# Patient Record
Sex: FEMALE | Race: WHITE | NOT HISPANIC OR LATINO | Employment: OTHER | URBAN - METROPOLITAN AREA
[De-identification: names, ages, dates, MRNs, and addresses within clinical notes are randomized per-mention and may not be internally consistent; named-entity substitution may affect disease eponyms.]

---

## 2023-02-27 DIAGNOSIS — M25.562 LEFT KNEE PAIN, UNSPECIFIED CHRONICITY: Primary | ICD-10-CM

## 2023-03-01 ENCOUNTER — HOSPITAL ENCOUNTER (OUTPATIENT)
Dept: GENERAL RADIOLOGY | Facility: HOSPITAL | Age: 73
Discharge: HOME | End: 2023-03-01
Admitting: PHYSICIAN ASSISTANT
Payer: MEDICARE

## 2023-03-01 ENCOUNTER — OFFICE VISIT (OUTPATIENT)
Dept: ORTHOPEDIC SURGERY | Facility: CLINIC | Age: 73
End: 2023-03-01
Payer: MEDICARE

## 2023-03-01 VITALS — BODY MASS INDEX: 26.52 KG/M2 | WEIGHT: 165 LBS | TEMPERATURE: 98.6 F | HEIGHT: 66 IN

## 2023-03-01 DIAGNOSIS — M79.662 PAIN OF LEFT LOWER LEG: ICD-10-CM

## 2023-03-01 DIAGNOSIS — M25.562 CHRONIC PAIN OF LEFT KNEE: Primary | ICD-10-CM

## 2023-03-01 DIAGNOSIS — G89.29 CHRONIC PAIN OF LEFT KNEE: Primary | ICD-10-CM

## 2023-03-01 DIAGNOSIS — M25.562 LEFT KNEE PAIN, UNSPECIFIED CHRONICITY: ICD-10-CM

## 2023-03-01 PROCEDURE — 73562 X-RAY EXAM OF KNEE 3: CPT

## 2023-03-01 PROCEDURE — 99204 OFFICE O/P NEW MOD 45 MIN: CPT | Performed by: PHYSICIAN ASSISTANT

## 2023-03-01 RX ORDER — DICLOFENAC SODIUM 75 MG/1
TABLET, DELAYED RELEASE ORAL
COMMUNITY

## 2023-03-01 RX ORDER — PRAVASTATIN SODIUM 40 MG
TABLET ORAL
COMMUNITY

## 2023-03-01 RX ORDER — ASPIRIN 81 MG/1
TABLET ORAL EVERY 24 HOURS
COMMUNITY

## 2023-03-01 NOTE — PROGRESS NOTES
"Chief Complaint  Pain and Establish Care of the Left Knee    Subjective    History of Present Illness      Yenni Wise is a 72 y.o. female who presents to [facility] for complaint of Knee Pain:   Patient complains of left knee pain.   The pain began approximately 2 months ago.   The pain is located over the posterior and lateral aspect.    She describes the symptoms as radiating.   Symptoms improve with ibuprofen and diclofenac.   The symptoms are worse with walking for long periods.     She reports intermittent pain in her foot and will radiate to the posterior and anterior aspect of her left lower leg/calf region.  She denies any pain in the thigh or hip. She she reports being unable to tell if the pain is coming from her foot or from her knee.  She reports difficulty standing up; however, when she begins to move, she is fine. When the patient ambulates for a long period of time, her knee would begin to bother her, and it would \"pop\". The pain began approximately 2 months ago when she was crawling on the floor, painting baseboards.  She reports she wears a compression stocking to lessen the pain; however, her leg would begin to swell as she is \"being on them\". She takes ibuprofen intermittently and diclofenac to treat the pain. The patient has taken diclofenac for a few years. The patient denies any injury to her left knee. She denies any pain in her foot or ankle while ambulating. She denies being diagnosed with rheumatoid arthritis and states \"it must be osteo\". Her family doctor is Ny Dubon MD. She denies being tested for rheumatoid arthritis for her hands.    She believes the reason of her foot pain is secondary to the nerves. The patient reports there is a \"dark place\"  on the posterior aspect of her left leg and feels this must be nerve related.  She denies any numbness and tingling in the area.  She reports significant discomfort in the calf region upon " "walking for very long distance.  This is occurring in both of the lower extremities but more in the left.      Objective   Vital Signs:   Temp 98.6 °F (37 °C)   Ht 167.6 cm (66\")   Wt 74.8 kg (165 lb)   BMI 26.63 kg/m²     Physical Exam  Vitals signs and nursing note reviewed.   Constitutional:       Appearance: Normal appearance.   Pulmonary:      Effort: Pulmonary effort is normal.   Skin:     General: Skin is warm and dry.      Capillary Refill: Capillary refill takes less than 2 seconds.   Neurological:      General: No focal deficit present.      Mental Status: She is alert and oriented to person, place, and time. Mental status is at baseline.   Psychiatric:         Mood and Affect: Mood normal.         Behavior: Behavior normal.         Thought Content: Thought content normal.         Judgment: Judgment normal.     Ortho Exam   LEFT knee  There is mild joint line tenderness at the medial aspect of the knee.   Positive for crepitus throughout range of motion.   Negative for effusion.  Positive patellar grind test.   Negative Lachman test.    Negative anterior and posterior drawer.  Range of motion in extension and flexion is: 0-115 degrees.  Neurovascular status is intact.    Dorsalis pedis and posterior tibial artery pulses are palpable.    Common peroneal nerve function is well preserved.  Gait is cautious and antalgic.  There is moderate tightness felt of the calfs, both lower extremities.      Result Review :   Radiologic studies - see below for interpretation  LEFT knee xrays  weightbearing/standing 3 views were ordered by Federico Garvey PA-C. Performed at [facility] on 3/1/2023. Images were independently viewed and interpreted by myself, my impression as follows:  Findings: Mild to moderate degenerative changes of the patellofemoral and medial compartment.  There is joint space narrowing noted at the medial compartment, lateral compartment appears fairly preserved.  Bony " lesion: no  Soft tissues: within normal limits  Joint spaces: decreased  Hardware appropriately positioned: not applicable  Prior studies available for comparison: no           PROCEDURE  - Large Joint Arthrocentesis: L knee on 3/3/2023 1:43 PM  Indications: pain  Details: 25 G needle, anteromedial approach  Medications: 160 mg methylPREDNISolone acetate 80 MG/ML; 2 mL lidocaine PF 2% 2 %  Outcome: tolerated well, no immediate complications  Procedure, treatment alternatives, risks and benefits explained, specific risks discussed. Consent was given by the patient. Immediately prior to procedure a time out was called to verify the correct patient, procedure, equipment, support staff and site/side marked as required. Patient was prepped and draped in the usual sterile fashion.                  Assessment   Assessment and Plan    Diagnoses and all orders for this visit:    1. Chronic pain of left knee (Primary)  -     - Large Joint Arthrocentesis: L knee    2. Pain of left lower leg         Follow Up   · Discussion of any imaging in detail. Discussion of orthopaedic goals.  · Risk, benefits, and merits of treatment alternatives reviewed with the patient. Treatment alternatives include: oral anti-inflammatories/NSAID and intra-articular steroid injection.  Advised at this time it is difficult to tell if her symptoms are truly coming from the knee or from another source.  We did discuss the possibility of this being a lumbar spine related, as well as the possibility of claudication and/or chronic compartment syndrome.  I will further discuss chronic compartment syndrome with Dr. Henderson and get back with the patient.  She agreed to try the intra-articular steroid injection.  Advised if this works then this may be indication that the knee is truly the issue.  · Ice, heat, and/or modalities as beneficial  · Risks of minor surgical procedure/intra-articular injection, including but not limited to pain, bleeding, infection  into the joint, pigment skin changes related to steroid administration, increased blood sugar levels secondary to steroid administration, scar, recurrence of pain, and tendon rupture associated with steroid administration and possible need for further surgery reviewed with patient.  She accepts these risks and wishes to proceed with procedure.  · Injected patient's left knee joint(s) with Depo-Medrol from a(n) anteromedial approach.  Patient tolerated the procedure well and no complications were encountered.  · Watch for signs and symptoms of infection  · Call or notify for any adverse effect from injection therapy  · Patient is encouraged to call or return for any issues or concerns.  · No brace was given at today's visit.  · Follow up as needed  • Patient was given instructions and counseling regarding her condition or for health maintenance advice. Please see specific information pulled into the AVS if appropriate.     Federico Garvey PA-C   Date of Encounter: 3/1/2023   Electronically signed by Federico Garvey PA-C, 03/01/23, 8:48 AM EST.     Transcribed from ambient dictation for Federico Garvey PA-C by Contreras Alejandra.  03/01/23   16:33 EST    Patient or patient representative verbalized consent to the visit recording.  I have personally performed the services described in this document as transcribed by the above individual, and it is both accurate and complete.  Federico Garvey PA-C  3/3/2023  13:47 EST

## 2023-03-03 ENCOUNTER — PATIENT ROUNDING (BHMG ONLY) (OUTPATIENT)
Dept: ORTHOPEDIC SURGERY | Facility: CLINIC | Age: 73
End: 2023-03-03
Payer: MEDICARE

## 2023-03-03 PROBLEM — M79.662 PAIN OF LEFT LOWER LEG: Status: ACTIVE | Noted: 2023-03-03

## 2023-03-03 PROBLEM — M25.562 CHRONIC PAIN OF LEFT KNEE: Status: ACTIVE | Noted: 2023-03-03

## 2023-03-03 PROBLEM — G89.29 CHRONIC PAIN OF LEFT KNEE: Status: ACTIVE | Noted: 2023-03-03

## 2023-03-03 PROCEDURE — 20610 DRAIN/INJ JOINT/BURSA W/O US: CPT | Performed by: PHYSICIAN ASSISTANT

## 2023-03-03 RX ORDER — METHYLPREDNISOLONE ACETATE 80 MG/ML
160 INJECTION, SUSPENSION INTRA-ARTICULAR; INTRALESIONAL; INTRAMUSCULAR; SOFT TISSUE
Status: COMPLETED | OUTPATIENT
Start: 2023-03-03 | End: 2023-03-03

## 2023-03-03 RX ORDER — LIDOCAINE HYDROCHLORIDE 20 MG/ML
2 INJECTION, SOLUTION EPIDURAL; INFILTRATION; INTRACAUDAL; PERINEURAL
Status: COMPLETED | OUTPATIENT
Start: 2023-03-03 | End: 2023-03-03

## 2023-03-03 RX ADMIN — METHYLPREDNISOLONE ACETATE 160 MG: 80 INJECTION, SUSPENSION INTRA-ARTICULAR; INTRALESIONAL; INTRAMUSCULAR; SOFT TISSUE at 13:43

## 2023-03-03 RX ADMIN — LIDOCAINE HYDROCHLORIDE 2 ML: 20 INJECTION, SOLUTION EPIDURAL; INFILTRATION; INTRACAUDAL; PERINEURAL at 13:43

## 2023-03-03 NOTE — PROGRESS NOTES
March 3, 2023    A Welcome Card has been sent to the patient for PATIENT ROUNDING with [facility]